# Patient Record
Sex: FEMALE | URBAN - NONMETROPOLITAN AREA
[De-identification: names, ages, dates, MRNs, and addresses within clinical notes are randomized per-mention and may not be internally consistent; named-entity substitution may affect disease eponyms.]

---

## 2018-10-26 ENCOUNTER — NURSE TRIAGE (OUTPATIENT)
Dept: ADMINISTRATIVE | Age: 23
End: 2018-10-26

## 2018-10-26 NOTE — TELEPHONE ENCOUNTER
[address] [facility] states, \"my friend is from [place] and she was seen in ED and they did an ultrasound and labs and said she is anemic but the bleeding isn't quitting should we take there to another ED jose her OB GYN dr is in [place] and we are in [facility] [place]YLERT[place]? \"  Advised ED did the normal tests for vaginal bleeding so it is best to call her OBGYN Dr in [place] for advice.